# Patient Record
Sex: FEMALE | Race: WHITE | Employment: OTHER | URBAN - METROPOLITAN AREA
[De-identification: names, ages, dates, MRNs, and addresses within clinical notes are randomized per-mention and may not be internally consistent; named-entity substitution may affect disease eponyms.]

---

## 2017-01-14 ENCOUNTER — HOSPITAL ENCOUNTER (OUTPATIENT)
Dept: CT IMAGING | Age: 57
Discharge: HOME | End: 2017-01-14
Attending: FAMILY MEDICINE
Payer: MEDICAID

## 2017-01-14 DIAGNOSIS — R10.30 LOWER ABDOMINAL PAIN: ICD-10-CM

## 2017-01-14 DIAGNOSIS — R19.5 LOOSE STOOLS: ICD-10-CM

## 2017-01-14 DIAGNOSIS — R15.9 INCONTINENCE OF FECES, UNSPECIFIED FECAL INCONTINENCE TYPE: ICD-10-CM

## 2017-01-14 LAB — CREAT BLD-MCNC: 1 MG/DL (ref 0.5–1.5)

## 2017-01-14 PROCEDURE — 82565 ASSAY OF CREATININE: CPT

## 2017-01-14 PROCEDURE — 74177 CT ABD & PELVIS W/CONTRAST: CPT

## 2017-01-26 ENCOUNTER — TELEPHONE (OUTPATIENT)
Dept: FAMILY MEDICINE CLINIC | Facility: CLINIC | Age: 57
End: 2017-01-26

## 2017-01-26 NOTE — TELEPHONE ENCOUNTER
Patient reports that she has a rash on back and is experiencing leg pain. She is concerned with recurrence of shingles.  She has an appt scheduled with Dr Suzanne Petresen tomorrow afternoon, but she would like to quickly discuss her concerns before her appt because

## 2017-01-27 ENCOUNTER — OFFICE VISIT (OUTPATIENT)
Dept: FAMILY MEDICINE CLINIC | Facility: CLINIC | Age: 57
End: 2017-01-27

## 2017-01-27 VITALS
DIASTOLIC BLOOD PRESSURE: 83 MMHG | RESPIRATION RATE: 14 BRPM | TEMPERATURE: 98 F | SYSTOLIC BLOOD PRESSURE: 120 MMHG | HEART RATE: 83 BPM | HEIGHT: 63.5 IN

## 2017-01-27 DIAGNOSIS — L28.0 NEURODERMATITIS: ICD-10-CM

## 2017-01-27 DIAGNOSIS — L29.9 LOCALIZED PRURITUS: ICD-10-CM

## 2017-01-27 DIAGNOSIS — L30.9 DERMATITIS: Primary | ICD-10-CM

## 2017-01-27 PROCEDURE — 99212 OFFICE O/P EST SF 10 MIN: CPT | Performed by: FAMILY MEDICINE

## 2017-01-27 PROCEDURE — 99214 OFFICE O/P EST MOD 30 MIN: CPT | Performed by: FAMILY MEDICINE

## 2017-01-27 RX ORDER — HYDROXYZINE HYDROCHLORIDE 10 MG/1
10 TABLET, FILM COATED ORAL 3 TIMES DAILY PRN
Qty: 40 TABLET | Refills: 0 | Status: SHIPPED | OUTPATIENT
Start: 2017-01-27 | End: 2017-02-06

## 2017-01-27 NOTE — PROGRESS NOTES
Patient ID: Gt Lyon is a 55year old female. HPI  Patient presents with:  Rash: back and head    She has lesions on her scalp and upper back.   She does have anxiety and states she is very worried as her  who is been at a job for 15 years is alert and oriented to person, place, and time. Skin: Skin is warm and dry. Psychiatric: Her mood appears anxious. Nursing note and vitals reviewed. Blood pressure 120/83, pulse 83, temperature 98.4 °F (36.9 °C), temperature source Oral, resp.  r

## 2017-02-09 ENCOUNTER — OFFICE VISIT (OUTPATIENT)
Dept: DERMATOLOGY CLINIC | Facility: CLINIC | Age: 57
End: 2017-02-09

## 2017-02-09 DIAGNOSIS — D23.9 BENIGN NEOPLASM OF SKIN, UNSPECIFIED LOCATION: ICD-10-CM

## 2017-02-09 DIAGNOSIS — L30.9 DERMATITIS: Primary | ICD-10-CM

## 2017-02-09 DIAGNOSIS — L29.9 PRURITUS: ICD-10-CM

## 2017-02-09 PROCEDURE — 99202 OFFICE O/P NEW SF 15 MIN: CPT | Performed by: DERMATOLOGY

## 2017-02-09 PROCEDURE — 99212 OFFICE O/P EST SF 10 MIN: CPT | Performed by: DERMATOLOGY

## 2017-02-09 RX ORDER — CEPHALEXIN 500 MG/1
500 CAPSULE ORAL 2 TIMES DAILY
Qty: 14 CAPSULE | Refills: 0 | Status: SHIPPED | OUTPATIENT
Start: 2017-02-09 | End: 2017-03-09

## 2017-02-09 RX ORDER — HYDROXYZINE HYDROCHLORIDE 25 MG/1
25 TABLET, FILM COATED ORAL EVERY 8 HOURS PRN
Qty: 60 TABLET | Refills: 3 | Status: SHIPPED | OUTPATIENT
Start: 2017-02-09 | End: 2017-05-10

## 2017-02-13 NOTE — PROGRESS NOTES
Robyn Phillips is a 55year old female. Patient presents with:  Rash: New patient present with rash to scalp, back, and rolf arms since afer dx with shingles (couple months. ). Red itching lesions. 9/10 pain and bleeding when scratched.  Dr. Carolin Caro rx o (two) times daily. Disp: 14 capsule Rfl: 0   triamcinolone acetonide 0.1 % External Cream Apply 1 Application topically 2 (two) times daily.  Disp: 454 g Rfl: 3   HydrOXYzine HCl 25 MG Oral Tab Take 1 tablet (25 mg total) by mouth every 8 (eight) hours as n History   Problem Relation Age of Onset   • Diabetes Father    • Hypertension Father    • Heart Disease Father    • Stroke Father    • Cancer Father      skin cancer   • Hypertension Mother    • Heart Disease Mother    • Stroke Mother      Stroke   • Hyper Melanie Brown. Mild eczematous changes. Numerous excoriations with secondary infection. Unclear whether ulceration at right parietal scalp from pruritus, prurigo nodularis or post shingles. Discussed use of topicals. Stress likely aggravating.   Hydroxyzine above.

## 2017-02-14 ENCOUNTER — TELEPHONE (OUTPATIENT)
Dept: FAMILY MEDICINE CLINIC | Facility: CLINIC | Age: 57
End: 2017-02-14

## 2017-02-14 NOTE — TELEPHONE ENCOUNTER
Pt is calling want to know when was her last pap and is she due for another  Pt is requesting a call back

## 2017-02-14 NOTE — TELEPHONE ENCOUNTER
Informed pt that I do not have a pap on file for her. She states that she use to get them done at a free clinic years ago. I advised to get one done here to make sure she is up to date. I transferred her to hospitals to schedule.

## 2017-03-01 RX ORDER — PRAVASTATIN SODIUM 40 MG
TABLET ORAL
Qty: 30 TABLET | Refills: 2 | Status: SHIPPED | OUTPATIENT
Start: 2017-03-01

## 2017-03-09 ENCOUNTER — TELEPHONE (OUTPATIENT)
Dept: FAMILY MEDICINE CLINIC | Facility: CLINIC | Age: 57
End: 2017-03-09

## 2017-03-09 ENCOUNTER — LAB ENCOUNTER (OUTPATIENT)
Dept: LAB | Age: 57
End: 2017-03-09
Attending: FAMILY MEDICINE
Payer: MEDICAID

## 2017-03-09 ENCOUNTER — OFFICE VISIT (OUTPATIENT)
Dept: FAMILY MEDICINE CLINIC | Facility: CLINIC | Age: 57
End: 2017-03-09

## 2017-03-09 VITALS
BODY MASS INDEX: 21 KG/M2 | HEART RATE: 74 BPM | DIASTOLIC BLOOD PRESSURE: 89 MMHG | SYSTOLIC BLOOD PRESSURE: 137 MMHG | WEIGHT: 121.63 LBS

## 2017-03-09 DIAGNOSIS — R30.0 DYSURIA: Primary | ICD-10-CM

## 2017-03-09 DIAGNOSIS — R10.31 RLQ ABDOMINAL PAIN: ICD-10-CM

## 2017-03-09 DIAGNOSIS — R30.0 DYSURIA: ICD-10-CM

## 2017-03-09 LAB — ERYTHROCYTE [SEDIMENTATION RATE] IN BLOOD: 8 MM/HR (ref 0–30)

## 2017-03-09 PROCEDURE — 99212 OFFICE O/P EST SF 10 MIN: CPT | Performed by: FAMILY MEDICINE

## 2017-03-09 PROCEDURE — 85060 BLOOD SMEAR INTERPRETATION: CPT

## 2017-03-09 PROCEDURE — 36415 COLL VENOUS BLD VENIPUNCTURE: CPT

## 2017-03-09 PROCEDURE — 85025 COMPLETE CBC W/AUTO DIFF WBC: CPT

## 2017-03-09 PROCEDURE — 99213 OFFICE O/P EST LOW 20 MIN: CPT | Performed by: FAMILY MEDICINE

## 2017-03-09 PROCEDURE — 85652 RBC SED RATE AUTOMATED: CPT

## 2017-03-09 RX ORDER — CIPROFLOXACIN 500 MG/1
500 TABLET, FILM COATED ORAL 2 TIMES DAILY
Qty: 10 TABLET | Refills: 0 | Status: SHIPPED | OUTPATIENT
Start: 2017-03-09 | End: 2017-03-14

## 2017-03-09 NOTE — PROGRESS NOTES
Zaire Augustine is a 55year old female. Patient presents with:  Pelvic Pain    HPI:   Took otc azo this morning. Symptoms started a few days ago. Pain  Right lower abdomen. No fevers. No vomiting but having nausea.  Having sharp pain weather urinating or n clear to auscultation  CARDIO: RRR without murmur  GI: good BS's,no masses, HSM. RLQ tenderness. No rebound. guarding  EXTREMITIES: no cyanosis, clubbing or edema      ASSESSMENT AND PLAN:   1.  Dysuria  Unable to read urine because of azo.   - Urine Cultur

## 2017-03-09 NOTE — TELEPHONE ENCOUNTER
Reason for Call/Chief Complaint:  Patient states she feels she has a UTI again   Onset: Few days - today worse   Nursing Assessment/Associated Symptoms: Patient states that she feels like she has a UTI once again.  States she has had a few times in the past

## 2017-03-09 NOTE — TELEPHONE ENCOUNTER
Pt states having a lot of pain- did not provide any further information  Requesting appt today with Dr Mike Champion or Dr Frantz Nguyen slots    Call transferred [phone]

## 2017-03-10 LAB
BASOPHILS # BLD: 0.1 K/UL (ref 0–0.2)
BASOPHILS NFR BLD: 1 %
EOSINOPHIL # BLD: 1 K/UL (ref 0–0.7)
EOSINOPHIL NFR BLD: 13 %
ERYTHROCYTE [DISTWIDTH] IN BLOOD BY AUTOMATED COUNT: 13.3 % (ref 11–15)
HCT VFR BLD AUTO: 37.3 % (ref 35–48)
HGB BLD-MCNC: 12.4 G/DL (ref 12–16)
LYMPHOCYTES # BLD: 1.7 K/UL (ref 1–4)
LYMPHOCYTES NFR BLD: 22 %
MCH RBC QN AUTO: 29 PG (ref 27–32)
MCHC RBC AUTO-ENTMCNC: 33.3 G/DL (ref 32–37)
MCV RBC AUTO: 87.2 FL (ref 80–100)
MONOCYTES # BLD: 0.2 K/UL (ref 0–1)
MONOCYTES NFR BLD: 3 %
NEUTROPHILS # BLD AUTO: 4.7 K/UL (ref 1.8–7.7)
NEUTROPHILS NFR BLD: 62 %
PLATELET # BLD AUTO: 174 K/UL (ref 140–400)
PMV BLD AUTO: 10.5 FL (ref 7.4–10.3)
RBC # BLD AUTO: 4.28 M/UL (ref 3.7–5.4)
WBC # BLD AUTO: 7.7 K/UL (ref 4–11)

## 2017-03-10 NOTE — TELEPHONE ENCOUNTER
Per pt, she went to her pharmacy on file and never received the Cipro,  Pls resend pt is waiting. Pls call pt when rx med was resend.

## 2017-03-10 NOTE — TELEPHONE ENCOUNTER
Pt was seen today for poss uti. Cipro was sent to the pharmacy but the pharmacy is telling the pt that they did not receive it. Did Dr. Jonathon Ferraro intend to prescribe this medication?  The med was sent to the pharmacy prior to pt's appt

## 2017-03-10 NOTE — TELEPHONE ENCOUNTER
Noted cipro was sent to [facility] by Dr Kamiin Tsang before the OV appt time of 3:30 as pointed out by CSS below--but from Dr Bryant Tsang note time stamp can deduce that pt was simply seen before the 3:30 appt time.  Pt states Dr Dimitrios Davies did state at the [address] she would be sendin

## 2017-03-14 ENCOUNTER — TELEPHONE (OUTPATIENT)
Dept: FAMILY MEDICINE CLINIC | Facility: CLINIC | Age: 57
End: 2017-03-14

## 2017-03-14 NOTE — TELEPHONE ENCOUNTER
----- Message from Poppy Bartlett MD sent at 3/14/2017 10:41 AM CDT -----  Tests are all normal. Nl WBC and normal urine culture. Pt did not have an urinary tract infection.

## 2017-03-14 NOTE — PROGRESS NOTES
Quick Note:    Tests are all normal. Nl WBC and normal urine culture. Pt did not have an urinary tract infection.     ______

## 2017-03-14 NOTE — TELEPHONE ENCOUNTER
Dr Olesya Tolliver, the patient has no urinary symptoms, but she continues to have lower abdominal pain

## 2017-03-14 NOTE — TELEPHONE ENCOUNTER
This seems to be chronic in nature. CT scan has been normal. Pt to follow up with GI regarding this.

## 2017-03-23 ENCOUNTER — TELEPHONE (OUTPATIENT)
Dept: FAMILY MEDICINE CLINIC | Facility: CLINIC | Age: 57
End: 2017-03-23

## 2017-03-23 NOTE — TELEPHONE ENCOUNTER
FEVER YESTERDAY, WEAK, FLU ARE THE SYMPTOMS.   PATIENT ASKING IF THE DOCTOR CAN CALL SOMETHING IN FOR HER

## 2017-03-23 NOTE — TELEPHONE ENCOUNTER
Actions Requested: Advised ER today for possible dehydration--pt will be driven there (usure which one).  Staff to f/u tomorrow  Situation/Background   Problem: vomiting and diarrhea   Onset: Tuesday   Associated Symptoms: Fever ~101 yesterday; no fever not

## 2017-03-28 NOTE — TELEPHONE ENCOUNTER
RYAN soto, called pt for ER f/u today  Pt stts she went [facility] and was diagnosed with flu and dehydration, was not admitted and discharged home to rest and push fluids  Pt stts she is getting better each day but stts she still can't eat (eats as tolerated), fee

## 2017-03-30 ENCOUNTER — OFFICE VISIT (OUTPATIENT)
Dept: FAMILY MEDICINE CLINIC | Facility: CLINIC | Age: 57
End: 2017-03-30

## 2017-03-30 VITALS
HEART RATE: 66 BPM | DIASTOLIC BLOOD PRESSURE: 74 MMHG | SYSTOLIC BLOOD PRESSURE: 117 MMHG | WEIGHT: 130 LBS | HEIGHT: 63.5 IN | BODY MASS INDEX: 22.75 KG/M2 | TEMPERATURE: 99 F

## 2017-03-30 DIAGNOSIS — R19.7 DIARRHEA, UNSPECIFIED TYPE: ICD-10-CM

## 2017-03-30 DIAGNOSIS — K13.0 LIP ULCER: Primary | ICD-10-CM

## 2017-03-30 PROCEDURE — 99212 OFFICE O/P EST SF 10 MIN: CPT | Performed by: FAMILY MEDICINE

## 2017-03-30 PROCEDURE — 99213 OFFICE O/P EST LOW 20 MIN: CPT | Performed by: FAMILY MEDICINE

## 2017-03-30 RX ORDER — DIPHENOXYLATE HYDROCHLORIDE AND ATROPINE SULFATE 2.5; .025 MG/1; MG/1
1 TABLET ORAL 4 TIMES DAILY PRN
Qty: 20 TABLET | Refills: 0 | Status: SHIPPED | OUTPATIENT
Start: 2017-03-30 | End: 2017-04-25

## 2017-03-30 RX ORDER — ONDANSETRON 4 MG/1
4 TABLET, ORALLY DISINTEGRATING ORAL
Refills: 0 | COMMUNITY
Start: 2017-03-24 | End: 2017-06-29 | Stop reason: ALTCHOICE

## 2017-03-30 RX ORDER — VALACYCLOVIR HYDROCHLORIDE 1 G/1
1 TABLET, FILM COATED ORAL EVERY 12 HOURS SCHEDULED
Qty: 14 TABLET | Refills: 0 | Status: SHIPPED | OUTPATIENT
Start: 2017-03-30 | End: 2017-04-06

## 2017-03-30 NOTE — PROGRESS NOTES
Dante Gillis is a 55year old female. Patient presents with:  Cough: productive clear mucusx 2 days  Diarrhea: abdominal pain    HPI:   Here for follow up. Went to [facility] for the flu.  Went there last week - went to the ER and received fluids - pain, back pain    EXAM:   /74 mmHg  Pulse 66  Temp(Src) 98.9 °F (37.2 °C) (Tympanic)  Ht 5' 3.5\" (1.613 m)  Wt 130 lb (58.968 kg)  BMI 22.66 kg/m2  GENERAL: well developed, well nourished,in no apparent distress  SKIN: left edge of lips ulcerations

## 2017-04-03 ENCOUNTER — TELEPHONE (OUTPATIENT)
Dept: GASTROENTEROLOGY | Facility: CLINIC | Age: 57
End: 2017-04-03

## 2017-04-03 NOTE — TELEPHONE ENCOUNTER
Dr. Milton Barksdale- please refer to PCP's most recent office visit note of 3/30/17 as well as your last office visit note with her on 4/16/15; EGD was done on 4/28/15 with 3 year recall; please advise for appt.; thanks!

## 2017-04-03 NOTE — TELEPHONE ENCOUNTER
Pt was referred by Dr. Alison Nice to be seen sooner than first available due to abdominal pain. Please call. Pt has [employer].

## 2017-04-03 NOTE — TELEPHONE ENCOUNTER
Complex patient with chronic abdominal pain. Reassuring CT scan January 14, 2017. Previous EGD and colonoscopy exams reviewed. Unfortunately, this will need to wait for next available for follow-up.

## 2017-04-04 NOTE — TELEPHONE ENCOUNTER
Messages left on both listed phone nos for pt to call back re: appt.; this is booked on 5/31/17 at 1600.

## 2017-04-04 NOTE — TELEPHONE ENCOUNTER
Pt returned call -  CSS offered 5/31/17 at 1600 and pt states the appt is too late. Requesting earlier in the day. PLs call. Thank you.

## 2017-04-04 NOTE — TELEPHONE ENCOUNTER
Pt is contacted re: earlier appt and this is booked for 5/12/17 at 1330 at [facility], arrival timie 1315; pt is aware of [facility] location and she is agreeable with this.

## 2017-04-22 ENCOUNTER — TELEPHONE (OUTPATIENT)
Dept: FAMILY MEDICINE CLINIC | Facility: CLINIC | Age: 57
End: 2017-04-22

## 2017-04-22 NOTE — TELEPHONE ENCOUNTER
Actions Requested: back pain, ICC, F/u on monday  Situation/Background   Problem: per pt right shoulder pain. Pain level 9/10. Tried tylenol with no relief. Hurts with movement.     Onset: 1 wk   Associated Symptoms: none   History of Same: broken shoulder

## 2017-04-22 NOTE — TELEPHONE ENCOUNTER
Patient reports that she is experiencing severe, sharp, burning pains in shoulder that occurs when she lifts anything. She states it causes her to double over. Please call [phone].

## 2017-04-24 NOTE — TELEPHONE ENCOUNTER
Pt states that she did not go to the IC over the weekend. Her pain continues at the same level of intensity. She is currently taking tylenol without significant relief. Can pt be added to Dr. Vamshi Rosas schedule tomorrow? Only SDS slots available.

## 2017-04-25 ENCOUNTER — OFFICE VISIT (OUTPATIENT)
Dept: FAMILY MEDICINE CLINIC | Facility: CLINIC | Age: 57
End: 2017-04-25

## 2017-04-25 VITALS
BODY MASS INDEX: 21 KG/M2 | DIASTOLIC BLOOD PRESSURE: 85 MMHG | WEIGHT: 118.63 LBS | HEART RATE: 79 BPM | SYSTOLIC BLOOD PRESSURE: 116 MMHG

## 2017-04-25 DIAGNOSIS — B02.9 HERPES ZOSTER WITHOUT COMPLICATION: Primary | ICD-10-CM

## 2017-04-25 PROCEDURE — 99213 OFFICE O/P EST LOW 20 MIN: CPT | Performed by: FAMILY MEDICINE

## 2017-04-25 PROCEDURE — 99212 OFFICE O/P EST SF 10 MIN: CPT | Performed by: FAMILY MEDICINE

## 2017-04-25 RX ORDER — VALACYCLOVIR HYDROCHLORIDE 1 G/1
1 TABLET, FILM COATED ORAL 3 TIMES DAILY
Qty: 21 TABLET | Refills: 0 | Status: SHIPPED | OUTPATIENT
Start: 2017-04-25 | End: 2017-05-02

## 2017-04-25 RX ORDER — METHYLPREDNISOLONE 4 MG/1
TABLET ORAL
Qty: 1 KIT | Refills: 0 | Status: SHIPPED | OUTPATIENT
Start: 2017-04-25 | End: 2017-06-26

## 2017-04-25 NOTE — H&P
Mikaela Ragland is a 55year old female. Patient presents with:  Shoulder Pain: right shoulder     HPI:   Pt reports she is in major pain. Reports did not fall. Started about a week ago. Reports just waking up with the pain.       Current Outpatient Prescri erythematous rash noted.  Tenderness with light touch to the skin  NECK: supple,no adenopathy,no bruits  LUNGS: clear to auscultation  CARDIO: RRR without murmur  EXTREMITIES: no cyanosis, clubbing or edema  Musculoskeletal: movement of right shoulder overa

## 2017-05-12 ENCOUNTER — TELEPHONE (OUTPATIENT)
Dept: GASTROENTEROLOGY | Facility: CLINIC | Age: 57
End: 2017-05-12

## 2017-05-12 ENCOUNTER — OFFICE VISIT (OUTPATIENT)
Dept: GASTROENTEROLOGY | Facility: CLINIC | Age: 57
End: 2017-05-12

## 2017-05-12 VITALS
HEIGHT: 63.5 IN | SYSTOLIC BLOOD PRESSURE: 108 MMHG | DIASTOLIC BLOOD PRESSURE: 72 MMHG | WEIGHT: 120 LBS | HEART RATE: 102 BPM | BODY MASS INDEX: 21 KG/M2

## 2017-05-12 DIAGNOSIS — R10.30 LOWER ABDOMINAL PAIN: ICD-10-CM

## 2017-05-12 DIAGNOSIS — R19.4 CHANGE IN BOWEL HABITS: Primary | ICD-10-CM

## 2017-05-12 DIAGNOSIS — R93.3 ABNORMAL CT SCAN, COLON: ICD-10-CM

## 2017-05-12 PROCEDURE — 99212 OFFICE O/P EST SF 10 MIN: CPT | Performed by: INTERNAL MEDICINE

## 2017-05-12 PROCEDURE — 99214 OFFICE O/P EST MOD 30 MIN: CPT | Performed by: INTERNAL MEDICINE

## 2017-05-12 NOTE — TELEPHONE ENCOUNTER
[facility] pharmacy contacted and spoke to [facility] who wanted to know if OK to substitute generic, I gave the verbal OK, he verbalized understanding.

## 2017-05-12 NOTE — TELEPHONE ENCOUNTER
Scheduled for: Colonoscopy 16538  Provider Name: Dr Mame Guerrero  Date:    Location:  [address] [place] [address]  Sedation:  MAC  Time:    Prep: colyte  Meds/Allergies Reconciled?:  Atorvastatin, ibuprofen, strawberry  Diagnosis with codes:  Change in bowel habits R19.4, Abnormal CT scan

## 2017-05-12 NOTE — PROGRESS NOTES
HPI:    Patient ID: Art Renee is a 55year old woman with distant history of cervical cancer, also dyslipidemia and depression. Cervical cancer was treated in the distal past possibly only with \"freezing\".   May have undergone a staging laparoscopy : Sukhi Rome MD (Physician)     Expand All Collapse All    HPI:     Patient ID: Collins Kinney returns for follow-up on waxing and waning, recurring abdominal pain. Her first remark on her interview today is \"I'm in pain. \"  She d symptoms, dysphagia, dyspepsia, nausea or vomiting.  10 point review of systems otherwise negative. Family history includes an aunt with colorectal cancer.     Ms. Carole Aragon is a smoker.    ============================================    Patient: Greg Velasquez, discomfort, as above. IMPRESSION:  1. Internal hemorrhoids.   2. Likely irritable bowel syndrome, visceral hypersensitivity with unusual  sensitivity to air insufflation, manipulation of the colonoscope,  contractions of the colon, even under the sedatio gastric cardia on retroflexed view.  Two   good  biopsies taken of this inflamed-appearing fold in the retroflexed    view in  the gastric cardia, likely gastric mucosa.  Looking around the    stomach,  there was diffuse mucosal edema without ulcer or eros with mild reflux   esophagitis.     *  Focal goblet cell metaplasia present (see comment).      *  No dysplasia identified.     *  Diff-Quik stain negative for Helicobacter pylori organisms (appropriate   control).     ===================  PET/CT scan 5/201 lesion is identified.    BILIARY:          Cholelithiasis.   No intra- or extra-hepatic biliary ductal dilatation.    SPLEEN:          Normal in size. Peripheral hypodensities are nonspecific, likely cystic in nature or hemangiomas.   PANCREAS:     No lesi Systems         Current Outpatient Prescriptions:  ondansetron 4 MG Oral Tablet Dispersible Take 4 mg by mouth. prn Disp:  Rfl: 0   PRAVASTATIN SODIUM 40 MG Oral Tab TAKE 1 TABLET (40 MG TOTAL) BY MOUTH NIGHTLY.  Disp: 30 tablet Rfl: 2   nicotine 21 MG/24HR colonoscopy examination to evaluate CT findings and the symptoms. Consider sending stool aspirate. I recommended colonoscopy examination with possible biopsy, possible polypectomy.  We discussed sedation options of conscious sedation versus MAC anesthes

## 2017-05-12 NOTE — PATIENT INSTRUCTIONS
Schedule colonoscopy exam at [facility]    MAC anesthesia    Golytely (PEG) 4L bowel prep    DX = change in bowel patterns, abnormal CT scan of the colon

## 2017-05-22 NOTE — TELEPHONE ENCOUNTER
Scheduled for:  Colonoscopy 18202  Provider Name:  Dr. Alan Garvin  Date:  7/31/17  Location:  [facility]  Sedation:  MAC  Time:  6611 (pt is aware to arrive at 1230)  Prep:  Colyte, mailed 5/23/17  Meds/Allergies Reconciled?:  Physician reviewed  Diagnosis with codes

## 2017-06-07 ENCOUNTER — HOSPITAL ENCOUNTER (OUTPATIENT)
Dept: MAMMOGRAPHY | Facility: HOSPITAL | Age: 57
Discharge: HOME | End: 2017-06-07
Attending: FAMILY MEDICINE
Payer: MEDICAID

## 2017-06-07 ENCOUNTER — HOSPITAL ENCOUNTER (OUTPATIENT)
Dept: ULTRASOUND IMAGING | Facility: HOSPITAL | Age: 57
Discharge: HOME | End: 2017-06-07
Attending: FAMILY MEDICINE
Payer: MEDICAID

## 2017-06-07 DIAGNOSIS — N63.10 MASS OF RIGHT BREAST: ICD-10-CM

## 2017-06-07 PROCEDURE — 76642 ULTRASOUND BREAST LIMITED: CPT | Performed by: FAMILY MEDICINE

## 2017-06-07 PROCEDURE — 77065 DX MAMMO INCL CAD UNI: CPT | Performed by: FAMILY MEDICINE

## 2017-06-26 ENCOUNTER — OFFICE VISIT (OUTPATIENT)
Dept: FAMILY MEDICINE CLINIC | Facility: CLINIC | Age: 57
End: 2017-06-26

## 2017-06-26 ENCOUNTER — LAB ENCOUNTER (OUTPATIENT)
Dept: LAB | Age: 57
End: 2017-06-26
Attending: FAMILY MEDICINE
Payer: MEDICAID

## 2017-06-26 VITALS
HEIGHT: 63.5 IN | HEART RATE: 105 BPM | SYSTOLIC BLOOD PRESSURE: 127 MMHG | WEIGHT: 120 LBS | DIASTOLIC BLOOD PRESSURE: 83 MMHG | BODY MASS INDEX: 21 KG/M2

## 2017-06-26 DIAGNOSIS — R59.0 LYMPHADENOPATHY, CERVICAL: Primary | ICD-10-CM

## 2017-06-26 DIAGNOSIS — R59.0 LYMPHADENOPATHY, CERVICAL: ICD-10-CM

## 2017-06-26 DIAGNOSIS — L98.9 SKIN LESION: ICD-10-CM

## 2017-06-26 PROCEDURE — 99213 OFFICE O/P EST LOW 20 MIN: CPT | Performed by: FAMILY MEDICINE

## 2017-06-26 PROCEDURE — 99212 OFFICE O/P EST SF 10 MIN: CPT | Performed by: FAMILY MEDICINE

## 2017-06-26 PROCEDURE — 36415 COLL VENOUS BLD VENIPUNCTURE: CPT

## 2017-06-26 PROCEDURE — 85025 COMPLETE CBC W/AUTO DIFF WBC: CPT

## 2017-06-26 PROCEDURE — 96372 THER/PROPH/DIAG INJ SC/IM: CPT | Performed by: FAMILY MEDICINE

## 2017-06-26 PROCEDURE — 85652 RBC SED RATE AUTOMATED: CPT

## 2017-06-26 RX ORDER — CEPHALEXIN 500 MG/1
500 CAPSULE ORAL 3 TIMES DAILY
Qty: 4030 CAPSULE | Refills: 0 | Status: SHIPPED | OUTPATIENT
Start: 2017-06-26 | End: 2017-07-06 | Stop reason: ALTCHOICE

## 2017-06-26 RX ORDER — CEFTRIAXONE 1 G/1
1000 INJECTION, POWDER, FOR SOLUTION INTRAMUSCULAR; INTRAVENOUS ONCE
Status: COMPLETED | OUTPATIENT
Start: 2017-06-26 | End: 2017-06-26

## 2017-06-26 RX ADMIN — CEFTRIAXONE 1000 MG: 1 INJECTION, POWDER, FOR SOLUTION INTRAMUSCULAR; INTRAVENOUS at 14:27:00

## 2017-06-26 NOTE — PROGRESS NOTES
Dante Gillis is a 65year old female. Patient presents with:  Bump: head, neck     HPI:   1 week with scalp lesions and then neck started to hurt her. No fevers and no chills. Reports not itchy. No one else in family with it.      Current Outpatient Pres (54.4 kg)   BMI 20.92 kg/m²   GENERAL: well developed, well nourished,in no apparent distress  SKIN:posterior scalp dime size crusted ulcerative lesion - 2 similar lesions in right anterior scalp   Right tender posterior cervical lymphadenopathy   ASSESSME

## 2017-06-29 ENCOUNTER — HOSPITAL ENCOUNTER (OUTPATIENT)
Dept: GENERAL RADIOLOGY | Age: 57
Discharge: HOME | End: 2017-06-29
Attending: FAMILY MEDICINE
Payer: MEDICAID

## 2017-06-29 ENCOUNTER — OFFICE VISIT (OUTPATIENT)
Dept: FAMILY MEDICINE CLINIC | Facility: CLINIC | Age: 57
End: 2017-06-29

## 2017-06-29 VITALS
HEART RATE: 65 BPM | DIASTOLIC BLOOD PRESSURE: 89 MMHG | WEIGHT: 118.19 LBS | BODY MASS INDEX: 21 KG/M2 | SYSTOLIC BLOOD PRESSURE: 119 MMHG | TEMPERATURE: 98 F

## 2017-06-29 DIAGNOSIS — R59.1 LYMPHADENOPATHY: ICD-10-CM

## 2017-06-29 DIAGNOSIS — R91.1 PULMONARY NODULE: ICD-10-CM

## 2017-06-29 DIAGNOSIS — R59.1 LYMPHADENOPATHY: Primary | ICD-10-CM

## 2017-06-29 PROCEDURE — 99213 OFFICE O/P EST LOW 20 MIN: CPT | Performed by: FAMILY MEDICINE

## 2017-06-29 PROCEDURE — 99212 OFFICE O/P EST SF 10 MIN: CPT | Performed by: FAMILY MEDICINE

## 2017-06-29 PROCEDURE — 71020 XR CHEST PA + LAT CHEST (CPT=71020): CPT | Performed by: FAMILY MEDICINE

## 2017-06-29 NOTE — PROGRESS NOTES
Ana Nichols is a 65year old female. Patient presents with:  Lesion: skin lesion fup     HPI:   Here for close follow up. Was seen few days ago and given Rocephin IM and started on keflex TID.  Reports still having some pain in her neck but lesions le kg/m²   GENERAL: well developed, well nourished,in no apparent distress  SKIN:posterior scalp dime size crusted ulcerative lesion - 2 similar lesions in right anterior scalp   Right tender posterior cervical lymphadenopathy   LUNGS: clear to auscultation

## 2017-06-29 NOTE — PROGRESS NOTES
Tests are all normal. Please continue with current treatment plan and make sure scheduled for CT chest.

## 2017-07-01 ENCOUNTER — TELEPHONE (OUTPATIENT)
Dept: FAMILY MEDICINE CLINIC | Facility: CLINIC | Age: 57
End: 2017-07-01

## 2017-07-01 NOTE — TELEPHONE ENCOUNTER
----- Message from Walker Pineda MD sent at 6/29/2017 12:51 PM CDT -----  Tests are all normal. Please continue with current treatment plan and make sure scheduled for CT chest.

## 2017-07-06 ENCOUNTER — OFFICE VISIT (OUTPATIENT)
Dept: FAMILY MEDICINE CLINIC | Facility: CLINIC | Age: 57
End: 2017-07-06

## 2017-07-06 VITALS
WEIGHT: 120 LBS | HEART RATE: 89 BPM | BODY MASS INDEX: 21 KG/M2 | SYSTOLIC BLOOD PRESSURE: 104 MMHG | DIASTOLIC BLOOD PRESSURE: 72 MMHG

## 2017-07-06 DIAGNOSIS — R59.1 HEAD AND NECK LYMPHADENOPATHY: Primary | ICD-10-CM

## 2017-07-06 DIAGNOSIS — R59.0 CERVICAL LYMPHADENOPATHY: ICD-10-CM

## 2017-07-06 PROCEDURE — 99212 OFFICE O/P EST SF 10 MIN: CPT | Performed by: FAMILY MEDICINE

## 2017-07-06 PROCEDURE — 99213 OFFICE O/P EST LOW 20 MIN: CPT | Performed by: FAMILY MEDICINE

## 2017-07-06 NOTE — PROGRESS NOTES
Deirdre Mullen is a 65year old female. Patient presents with:  Bump    HPI:   Follow up. Been about 2 weeks now with symptoms. Seen on 6/26 and  Again on 6/29. Received rocephin IM and po keflex. No improvements.  Reports now left side of neck is swelli normocephalic,ears and throat are clear  NECK: bilateral cervical tender lymphadenopathy   LUNGS: clear to auscultation  CARDIO: RRR without murmur      ASSESSMENT AND PLAN:   1.  Head and neck lymphadenopathy  Started with skin lesions on scalp then right

## 2017-07-07 ENCOUNTER — OFFICE VISIT (OUTPATIENT)
Dept: OTOLARYNGOLOGY | Facility: CLINIC | Age: 57
End: 2017-07-07

## 2017-07-07 VITALS
BODY MASS INDEX: 21 KG/M2 | SYSTOLIC BLOOD PRESSURE: 106 MMHG | TEMPERATURE: 98 F | HEIGHT: 63.5 IN | WEIGHT: 120 LBS | DIASTOLIC BLOOD PRESSURE: 80 MMHG

## 2017-07-07 DIAGNOSIS — R59.1 LYMPHADENOPATHY: Primary | ICD-10-CM

## 2017-07-07 DIAGNOSIS — B02.29 POSTHERPETIC NEURALGIA: ICD-10-CM

## 2017-07-07 PROCEDURE — 99212 OFFICE O/P EST SF 10 MIN: CPT | Performed by: OTOLARYNGOLOGY

## 2017-07-07 PROCEDURE — 99244 OFF/OP CNSLTJ NEW/EST MOD 40: CPT | Performed by: OTOLARYNGOLOGY

## 2017-07-07 RX ORDER — AMOXICILLIN 500 MG/1
500 CAPSULE ORAL 3 TIMES DAILY
Qty: 30 CAPSULE | Refills: 0 | Status: SHIPPED | OUTPATIENT
Start: 2017-07-07 | End: 2017-07-14

## 2017-07-07 RX ORDER — METHYLPREDNISOLONE 4 MG/1
TABLET ORAL
Qty: 1 PACKAGE | Refills: 0 | Status: SHIPPED | OUTPATIENT
Start: 2017-07-07

## 2017-07-07 NOTE — PROGRESS NOTES
Wilbert Goldberg is a 65year old female.   Patient presents with:  Neck Pain: neck pain/swelling  for 4 months      HISTORY OF PRESENT ILLNESS  7/7/2017  She presents with neck pain she attributes this to the fact that she had shingles and after having sh URETERAL STENT  1980: LEEP  2005: LITHOTRIPSY  4/28/15: UPPER GI ENDOSCOPY,BIOPSY      REVIEW OF SYSTEMS    System Neg/Pos Details   Constitutional Negative Fatigue, fever and weight loss. ENMT Negative Drooling.    Eyes Negative Blurred vision and vision Nose/Mouth/Throat abNormal External nose - Normal. Lips/teeth/gums -multiple missing and carious teeth with periodontal disease tonsils - Normal. Oropharynx - Normal.Nares - Right: Normal Left: Normal. Septum -Normal  Turbinates - Right: Normal, Left: No

## 2017-07-07 NOTE — PATIENT INSTRUCTIONS
Lymphadenopathy  Lymphadenopathy is swelling of the lymph nodes. Lymph nodes are small, bean-shaped glands around the body. What are lymph nodes? Lymph nodes are part of your immune system.  The glands are found in your neck, armpits, groin, chest, and You may also have symptoms from an infection causing the swollen glands. These symptoms may include fever, sore throat, body aches, or cough. Diagnosing lymphadenopathy  Your health care provider will ask about your health history and symptoms.  He or she © 6882-3271 [address], [address] [place]. All rights reserved. This information is not intended as a substitute for professional medical care. Always follow your healthcare professional's instructions.

## 2017-07-10 ENCOUNTER — TELEPHONE (OUTPATIENT)
Dept: OTOLARYNGOLOGY | Facility: CLINIC | Age: 57
End: 2017-07-10

## 2017-07-10 NOTE — TELEPHONE ENCOUNTER
Called pt's insurance [phone], spoke with Darvin Johnson, case is pending clinical review, progress notes faxed to [phone], confirmation received, tracking number [id number].

## 2017-07-12 NOTE — TELEPHONE ENCOUNTER
Spoke with pt and informed that she can schedule CT soft tissue of neck at this time, authorization number [id number], valid 7-10-17 thru 9-8-17 to be done at [facility], [facility], [facility], [address] [place] [address].  Pt stated she does not w

## 2017-07-14 ENCOUNTER — HOSPITAL ENCOUNTER (OUTPATIENT)
Dept: CT IMAGING | Age: 57
Discharge: HOME | End: 2017-07-14
Attending: FAMILY MEDICINE
Payer: COMMERCIAL

## 2017-07-14 DIAGNOSIS — R91.1 PULMONARY NODULE: ICD-10-CM

## 2017-07-14 LAB — CREAT BLD-MCNC: 0.7 MG/DL (ref 0.5–1.5)

## 2017-07-14 PROCEDURE — 71260 CT THORAX DX C+: CPT | Performed by: FAMILY MEDICINE

## 2017-07-14 PROCEDURE — 82565 ASSAY OF CREATININE: CPT

## 2017-07-14 NOTE — TELEPHONE ENCOUNTER
Spoke with pt regarding changing the facility for her CT scan, pt stated she did contact her insurance and they were able to change the facility to [facility], pt was provided with central scheduling phone number.  Authorization number [id number]

## 2017-07-18 DIAGNOSIS — R91.1 PULMONARY NODULE: Primary | ICD-10-CM

## 2017-07-18 NOTE — PROGRESS NOTES
Nodule is overall stable with very slight growth so will have pt see pulmonary to make sure we do not need to continue imaging her for this.  Referral placed

## 2017-07-26 RX ORDER — SODIUM CHLORIDE, SODIUM LACTATE, POTASSIUM CHLORIDE, CALCIUM CHLORIDE 600; 310; 30; 20 MG/100ML; MG/100ML; MG/100ML; MG/100ML
INJECTION, SOLUTION INTRAVENOUS CONTINUOUS
Status: CANCELLED | OUTPATIENT
Start: 2017-07-26

## 2017-07-28 ENCOUNTER — HOSPITAL ENCOUNTER (OUTPATIENT)
Dept: CT IMAGING | Age: 57
Discharge: HOME | End: 2017-07-28
Attending: OTOLARYNGOLOGY
Payer: COMMERCIAL

## 2017-07-28 DIAGNOSIS — R59.1 LYMPHADENOPATHY: ICD-10-CM

## 2017-07-28 LAB — CREAT BLD-MCNC: 0.7 MG/DL (ref 0.5–1.5)

## 2017-07-28 PROCEDURE — 70491 CT SOFT TISSUE NECK W/DYE: CPT | Performed by: OTOLARYNGOLOGY

## 2017-07-28 PROCEDURE — 82565 ASSAY OF CREATININE: CPT

## 2017-07-29 ENCOUNTER — TELEPHONE (OUTPATIENT)
Dept: FAMILY MEDICINE CLINIC | Facility: CLINIC | Age: 57
End: 2017-07-29

## 2017-07-29 NOTE — TELEPHONE ENCOUNTER
Pt is requesting a prescription for the solution to drink before she takes to colonoscopy. Pt states she will have colonoscopy on Monday.

## 2017-07-30 ENCOUNTER — APPOINTMENT (OUTPATIENT)
Dept: GENERAL RADIOLOGY | Age: 57
End: 2017-07-30
Attending: EMERGENCY MEDICINE
Payer: COMMERCIAL

## 2017-07-30 ENCOUNTER — HOSPITAL ENCOUNTER (OUTPATIENT)
Age: 57
Discharge: HOME | End: 2017-07-30
Attending: EMERGENCY MEDICINE
Payer: COMMERCIAL

## 2017-07-30 VITALS
DIASTOLIC BLOOD PRESSURE: 93 MMHG | RESPIRATION RATE: 18 BRPM | TEMPERATURE: 98 F | HEIGHT: 63 IN | SYSTOLIC BLOOD PRESSURE: 151 MMHG | HEART RATE: 86 BPM | OXYGEN SATURATION: 99 % | BODY MASS INDEX: 21.26 KG/M2 | WEIGHT: 120 LBS

## 2017-07-30 DIAGNOSIS — S93.512A SPRAIN OF INTERPHALANGEAL JOINT OF LEFT GREAT TOE, INITIAL ENCOUNTER: Primary | ICD-10-CM

## 2017-07-30 PROCEDURE — 99213 OFFICE O/P EST LOW 20 MIN: CPT

## 2017-07-30 PROCEDURE — 73660 X-RAY EXAM OF TOE(S): CPT | Performed by: EMERGENCY MEDICINE

## 2017-07-30 RX ORDER — ACETAMINOPHEN 500 MG
1000 TABLET ORAL ONCE
Status: COMPLETED | OUTPATIENT
Start: 2017-07-30 | End: 2017-07-30

## 2017-07-30 NOTE — ED PROVIDER NOTES
Patient Seen in: [facility] In [address] [place] [address]    History   Patient presents with:  Lower Extremity Injury (musculoskeletal)    Stated Complaint: Lt Toe Injury    HPI  Yesterday patient was at home when she stepped on a blanket that was on Smoking status: Current Every Day Smoker                                                   Packs/day: 0.50      Years: 0.00      Smokeless tobacco: Never Used                      Alcohol use:  No                Review of Systems    Positive for stated comp XR TOE(S) (MIN 2 VIEWS), LEFT 1ST (CPT=73660) (Final result)   Result time 07/30/17 09:08:40   Final result by Gertrude Hilario MD (07/30/17 09:08:40)                Impression:    CONCLUSION: Soft tissue swelling without acute fracture or dislocation.

## 2017-07-30 NOTE — ED NOTES
Wear post op shoe ice elevate follow up with Dr. Erika Aguayo  in 2 days go to the ed for swelling increased pain new or worse concerns

## 2017-07-31 ENCOUNTER — TELEPHONE (OUTPATIENT)
Dept: GASTROENTEROLOGY | Facility: CLINIC | Age: 57
End: 2017-07-31

## 2017-07-31 ENCOUNTER — HOSPITAL ENCOUNTER (OUTPATIENT)
Facility: HOSPITAL | Age: 57
Setting detail: HOSPITAL OUTPATIENT SURGERY
Discharge: HOME | End: 2017-07-31
Attending: INTERNAL MEDICINE | Admitting: INTERNAL MEDICINE
Payer: COMMERCIAL

## 2017-07-31 ENCOUNTER — SURGERY (OUTPATIENT)
Age: 57
End: 2017-07-31

## 2017-07-31 VITALS
SYSTOLIC BLOOD PRESSURE: 113 MMHG | DIASTOLIC BLOOD PRESSURE: 78 MMHG | OXYGEN SATURATION: 95 % | HEART RATE: 85 BPM | WEIGHT: 120 LBS | BODY MASS INDEX: 21.26 KG/M2 | RESPIRATION RATE: 16 BRPM | HEIGHT: 63 IN

## 2017-07-31 NOTE — TELEPHONE ENCOUNTER
Spoke to pharmacy. They wanted to know if the Colyte 240 gm is ok. Pharmacist notified this would be ok.

## 2017-07-31 NOTE — TELEPHONE ENCOUNTER
GI RNs and Gladys Swain  Ms. Shane Hidden states that she did not receive the Colyte prescription mailed to her in May. She was unable to reach us by telephone before the procedure. Unfortunately she did not drink the bowel prep last night.   Today's colonoscopy ca

## 2017-07-31 NOTE — TELEPHONE ENCOUNTER
Pharmacy called to clarify Colyte RX. Please call.         Current Outpatient Prescriptions:   •  PEG 3350-KCl-NaBcb-NaCl-NaSulf (COLYTE WITH FLAVOR PACKS) 227.1 g Oral Recon Soln, Mix up and chill, then drink as directed., Disp: 1 Bottle, Rfl: 0

## 2017-07-31 NOTE — TELEPHONE ENCOUNTER
This should have been addressed by GI who performs the colonoscopy. Left message informing pt of this and to only call back if she still needs something from Dr Alpa Garcia office where this message was routed.

## 2017-08-09 ENCOUNTER — OFFICE VISIT (OUTPATIENT)
Dept: OTOLARYNGOLOGY | Facility: CLINIC | Age: 57
End: 2017-08-09

## 2017-08-09 VITALS
HEIGHT: 63 IN | BODY MASS INDEX: 21.26 KG/M2 | WEIGHT: 120 LBS | SYSTOLIC BLOOD PRESSURE: 130 MMHG | TEMPERATURE: 98 F | HEART RATE: 68 BPM | DIASTOLIC BLOOD PRESSURE: 70 MMHG

## 2017-08-09 DIAGNOSIS — R59.1 LYMPHADENOPATHY: Primary | ICD-10-CM

## 2017-08-09 PROCEDURE — 38505 NEEDLE BIOPSY LYMPH NODES: CPT | Performed by: OTOLARYNGOLOGY

## 2017-08-09 PROCEDURE — 99212 OFFICE O/P EST SF 10 MIN: CPT | Performed by: OTOLARYNGOLOGY

## 2017-08-09 PROCEDURE — 99213 OFFICE O/P EST LOW 20 MIN: CPT | Performed by: OTOLARYNGOLOGY

## 2017-08-09 RX ORDER — METHYLPREDNISOLONE 4 MG/1
TABLET ORAL
Qty: 1 PACKAGE | Refills: 0 | Status: SHIPPED | OUTPATIENT
Start: 2017-08-09

## 2017-08-09 RX ORDER — CLINDAMYCIN HYDROCHLORIDE 150 MG/1
300 CAPSULE ORAL 3 TIMES DAILY
Qty: 28 CAPSULE | Refills: 0 | Status: SHIPPED | OUTPATIENT
Start: 2017-08-09 | End: 2017-08-23

## 2017-08-09 NOTE — PROGRESS NOTES
Clara Freeman is a 65year old female. Patient presents with:   Follow - Up: Follow Up CT Scan done on 7/28/17      HISTORY OF PRESENT ILLNESS  7/7/2017  She presents with neck pain she attributes this to the fact that she had shingles and after having Gall stones    • Hyperlipidemia    • Kidney stones    • Lung nodule    • Vitamin D deficiency        Past Surgical History:  12/16/14: COLONOSCOPY  2005[place] [address]  2005: LITHOTRIPSY  4/28/15: [facility] ENDOSCOPY,BIOPSY Normal. TM - Right: Normal, Left: Normal.   Skin Normal Inspection - Normal.         Lymph Detail Normal Submental. Submandibular. Anterior cervical. Posterior cervical. Supraclavicular.    Larynx  No lesions noted with normal vc monbility   Nose/Mouth/Thro believe this is an infectious etiology possibly from her teeth    2. Postherpetic neuralgia  Is significant pain out of proportion to her physical findings I wonder if this is represents postherpetic neuralgia.             Roslyn Orellana MD

## 2017-08-09 NOTE — PATIENT INSTRUCTIONS
Lymphadenopathy  Lymphadenopathy is swelling of the lymph nodes. Lymph nodes are small, bean-shaped glands around the body. What are lymph nodes? Lymph nodes are part of your immune system.  The glands are found in your neck, armpits, groin, chest, and You may also have symptoms from an infection causing the swollen glands. These symptoms may include fever, sore throat, body aches, or cough. Diagnosing lymphadenopathy  Your health care provider will ask about your health history and symptoms.  He or she © [phone] [address], [address] [place]. All rights reserved. This information is not intended as a substitute for professional medical care. Always follow your healthcare professional's instructions.

## 2017-08-11 ENCOUNTER — TELEPHONE (OUTPATIENT)
Dept: OTOLARYNGOLOGY | Facility: CLINIC | Age: 57
End: 2017-08-11

## 2017-08-11 DIAGNOSIS — R59.1 LYMPHADENOPATHY: Primary | ICD-10-CM

## 2017-08-16 NOTE — TELEPHONE ENCOUNTER
Pt informed of note below and pt order generated for US Guided biopsy and faxed to [place] in [address].

## 2017-08-16 NOTE — TELEPHONE ENCOUNTER
Ami Rand MD   to [facility] Clinical Staff           12:42 PM   Please call pt and inform her the biopsy was non conclusive Id like to schedule her for an \"ultrasound guided core biopsy of right neck lymph node include flow cytometry\"

## 2017-09-05 ENCOUNTER — HOSPITAL ENCOUNTER (OUTPATIENT)
Dept: ULTRASOUND IMAGING | Facility: HOSPITAL | Age: 57
Discharge: HOME | End: 2017-09-05
Attending: OTOLARYNGOLOGY
Payer: COMMERCIAL

## 2017-09-05 NOTE — IMAGING NOTE
NO NOTE: PT DID NOT ARRIVE FOR BIOPSY. CALLED PT, AND SHE STATED SHE WAS UNAWARE SHE HAD A BIOPSY SCHEDULED FOR TODAY.  PT ACKNOWLEDGED NEED TO RESCHEDULE

## 2017-09-11 ENCOUNTER — TELEPHONE (OUTPATIENT)
Dept: GASTROENTEROLOGY | Facility: CLINIC | Age: 57
End: 2017-09-11

## 2017-09-11 DIAGNOSIS — R93.89 ABNORMAL CT SCAN: ICD-10-CM

## 2017-09-11 DIAGNOSIS — R10.9 ABDOMINAL PAIN, UNSPECIFIED ABDOMINAL LOCATION: ICD-10-CM

## 2017-09-11 DIAGNOSIS — R19.4 CHANGE IN BOWEL HABITS: Primary | ICD-10-CM

## 2017-09-11 NOTE — TELEPHONE ENCOUNTER
Pt called to schedule colonoscopy. Pt was previously scheduled for 7/31/17 but had to cancel. Please call.

## 2017-09-13 ENCOUNTER — OFFICE VISIT (OUTPATIENT)
Dept: PULMONOLOGY | Facility: CLINIC | Age: 57
End: 2017-09-13

## 2017-09-13 VITALS
BODY MASS INDEX: 21.37 KG/M2 | HEART RATE: 67 BPM | RESPIRATION RATE: 18 BRPM | SYSTOLIC BLOOD PRESSURE: 126 MMHG | DIASTOLIC BLOOD PRESSURE: 82 MMHG | WEIGHT: 120.63 LBS | OXYGEN SATURATION: 98 % | HEIGHT: 63 IN

## 2017-09-13 DIAGNOSIS — R59.1 HEAD AND NECK LYMPHADENOPATHY: ICD-10-CM

## 2017-09-13 DIAGNOSIS — R93.89 ABNORMAL CT OF THE CHEST: Primary | ICD-10-CM

## 2017-09-13 DIAGNOSIS — Z72.0 TOBACCO ABUSE: ICD-10-CM

## 2017-09-13 PROCEDURE — 99212 OFFICE O/P EST SF 10 MIN: CPT | Performed by: INTERNAL MEDICINE

## 2017-09-13 PROCEDURE — 99243 OFF/OP CNSLTJ NEW/EST LOW 30: CPT | Performed by: INTERNAL MEDICINE

## 2017-09-13 RX ORDER — VENLAFAXINE HYDROCHLORIDE 37.5 MG/1
CAPSULE, EXTENDED RELEASE ORAL
COMMUNITY
Start: 2017-09-01

## 2017-09-13 NOTE — PROGRESS NOTES
Pulmonary Consult Note    HPI:   Peterson Palmer is a 65year old female with Patient presents with:  Consult: Abnormal CT Chest    Brittney Ramos MD    Pt is a 64 yo smoker with h/o anxiety/depression, cervical cancer, HL, and other med prob who has n Rfl: 0   nicotine 21 MG/24HR Transdermal Patch 24 Hr  Disp:  Rfl: 0         Allergies:    Atorvastatin Calciu*    Rash  Ibuprofen               Rash  Strawberry              Rash, Swelling    Comment:Swelling (tongue)    Social History:  Social History pt however  Plan: rec quit smoking   Follow expectantly    (Z72.0) Tobacco abuse  Extensive counseling provided  Plan: asked pt to return to clinic when she is ready to consider stopping smoking    (R59.1) Head and neck lymphadenopathy  New  Plan: f/u ENT

## 2017-09-14 NOTE — TELEPHONE ENCOUNTER
Dr. Morgan Benson - I spoke with this pt, she has [employer] & is not planning on changing insurances. She originally had her procedure scheduled in July, but had to cancel. I told her I would need to get back to her about scheduling.  Is there any way we can fit

## 2017-09-14 NOTE — TELEPHONE ENCOUNTER
OK to fit in / overbook anywhere we can - Tuesday probably makes the most sense. Needs to be at [address] [place] [address]. Please either overbook now for any Tuesday remaining this month (9/19 or 9/26) or call on a Friday/Monday when the blocks open up to book on a Tuesday.

## 2017-09-18 NOTE — TELEPHONE ENCOUNTER
CBLM to schedule procedure. Please transfer to Karin Sykes or Olivia Tidwell in GI. Will schedule colon with Dr. Kay Gorman on 9/26/17 @ 12:45 pm if pt is ok with date/time. Ok to book per [employer].

## 2017-09-19 NOTE — TELEPHONE ENCOUNTER
Scheduled for:  Colonoscopy - [address]3472  Provider Name:  Dr. Reyna Gr  Date:  9/26/17  Location:  [facility]  Sedation:  MAC  Time:  12:45 pm (pt is aware to arrive at 11:45 am), time OK per Radha  Prep:  Colyte, Prep instructions were given to pt over the phone, pt verb

## 2017-09-19 NOTE — TELEPHONE ENCOUNTER
Dr. Morgan Del Rio I was able to get this [facility] pt in for you on Tuesday, 9/26/17 @ 12:45 pm. Please let me know if you have any questions or concerns. Thank you!

## 2017-09-26 ENCOUNTER — TELEPHONE (OUTPATIENT)
Dept: GASTROENTEROLOGY | Facility: CLINIC | Age: 57
End: 2017-09-26

## 2017-09-26 DIAGNOSIS — R19.4 CHANGE IN BOWEL HABITS: Primary | ICD-10-CM

## 2017-09-26 DIAGNOSIS — R10.9 ABDOMINAL PAIN, UNSPECIFIED ABDOMINAL LOCATION: ICD-10-CM

## 2017-09-26 DIAGNOSIS — R93.89 ABNORMAL CT SCAN: ICD-10-CM

## 2017-09-26 NOTE — TELEPHONE ENCOUNTER
Spoke to pt. She sounded very ill over the phone. Colonoscopy cancelled in EPIC and a Surgical Change Request was sent to [facility]. Pt has [address]. Will not be able to reschedule until pt has a new insurance.

## 2017-09-26 NOTE — TELEPHONE ENCOUNTER
Today's colonoscopy exam cancelled as per today's notes. Office visit of 5/12/2017 reviewed. Previous colonoscopy examination 12/16/2014 was reassuring with findings of internal hemorrhoids, suspicion for functional syndrome, \"IBS\".   Good prep avtar

## 2019-10-08 RX ORDER — NICOTINE 14MG/24HR
PATCH, TRANSDERMAL 24 HOURS TRANSDERMAL
Qty: 30 PATCH | Refills: 0 | Status: SHIPPED | OUTPATIENT
Start: 2019-10-08 | End: 2019-11-13

## 2019-10-08 NOTE — TELEPHONE ENCOUNTER
CSS--please call patient to schedule appt for refill--has not been in office in over 2 years (called [address] and relayed same message)    Review pended refill request as it does not fall under a protocol.     Last Rx: 9/26/15  LOV: 7/06/17  Requested

## 2019-10-17 RX ORDER — NICOTINE 21 MG/24HR
PATCH, TRANSDERMAL 24 HOURS TRANSDERMAL
Refills: 0 | OUTPATIENT
Start: 2019-10-17

## 2019-10-18 NOTE — TELEPHONE ENCOUNTER
ROXANE please assist with scheduling f/u appt, pt does not have mychart, thanks (LOV 2 years ago, davi no longer accepted)      Duplicate request, previously addressed, script sent 10/8/19 #30#0

## 2019-10-26 NOTE — TELEPHONE ENCOUNTER
Patient states no longer with [facility], [facility] due to insurance.

## 2019-11-14 RX ORDER — NICOTINE 21 MG/24HR
PATCH, TRANSDERMAL 24 HOURS TRANSDERMAL
Qty: 28 PATCH | Refills: 0 | Status: SHIPPED | OUTPATIENT
Start: 2019-11-14

## 2022-05-09 ENCOUNTER — HOSPITAL ENCOUNTER (EMERGENCY)
Facility: HOSPITAL | Age: 62
Discharge: HOME | End: 2022-05-09
Attending: EMERGENCY MEDICINE
Payer: MEDICAID

## 2022-05-09 ENCOUNTER — APPOINTMENT (OUTPATIENT)
Dept: GENERAL RADIOLOGY | Facility: HOSPITAL | Age: 62
End: 2022-05-09
Attending: EMERGENCY MEDICINE
Payer: MEDICAID

## 2022-05-09 VITALS
HEART RATE: 89 BPM | HEIGHT: 63 IN | OXYGEN SATURATION: 100 % | RESPIRATION RATE: 18 BRPM | TEMPERATURE: 98 F | BODY MASS INDEX: 23.92 KG/M2 | DIASTOLIC BLOOD PRESSURE: 81 MMHG | WEIGHT: 135 LBS | SYSTOLIC BLOOD PRESSURE: 95 MMHG

## 2022-05-09 DIAGNOSIS — J44.1 COPD EXACERBATION (HCC): Primary | ICD-10-CM

## 2022-05-09 LAB
ANION GAP SERPL CALC-SCNC: 8 MMOL/L (ref 0–18)
BASOPHILS # BLD AUTO: 0.07 X10(3) UL (ref 0–0.2)
BASOPHILS NFR BLD AUTO: 0.9 %
BUN BLD-MCNC: 25 MG/DL (ref 7–18)
BUN/CREAT SERPL: 14.5 (ref 10–20)
CALCIUM BLD-MCNC: 9.3 MG/DL (ref 8.5–10.1)
CHLORIDE SERPL-SCNC: 103 MMOL/L (ref 98–112)
CO2 SERPL-SCNC: 25 MMOL/L (ref 21–32)
CREAT BLD-MCNC: 1.72 MG/DL
DEPRECATED RDW RBC AUTO: 41.8 FL (ref 35.1–46.3)
EOSINOPHIL # BLD AUTO: 0.22 X10(3) UL (ref 0–0.7)
EOSINOPHIL NFR BLD AUTO: 2.9 %
ERYTHROCYTE [DISTWIDTH] IN BLOOD BY AUTOMATED COUNT: 12.9 % (ref 11–15)
FLUAV + FLUBV RNA SPEC NAA+PROBE: NEGATIVE
FLUAV + FLUBV RNA SPEC NAA+PROBE: NEGATIVE
GLUCOSE BLD-MCNC: 144 MG/DL (ref 70–99)
HCT VFR BLD AUTO: 40.9 %
HGB BLD-MCNC: 13.2 G/DL
IMM GRANULOCYTES # BLD AUTO: 0.02 X10(3) UL (ref 0–1)
IMM GRANULOCYTES NFR BLD: 0.3 %
LYMPHOCYTES # BLD AUTO: 1.35 X10(3) UL (ref 1–4)
LYMPHOCYTES NFR BLD AUTO: 18 %
MCH RBC QN AUTO: 28.5 PG (ref 26–34)
MCHC RBC AUTO-ENTMCNC: 32.3 G/DL (ref 31–37)
MCV RBC AUTO: 88.3 FL
MONOCYTES # BLD AUTO: 0.54 X10(3) UL (ref 0.1–1)
MONOCYTES NFR BLD AUTO: 7.2 %
NEUTROPHILS # BLD AUTO: 5.29 X10 (3) UL (ref 1.5–7.7)
NEUTROPHILS # BLD AUTO: 5.29 X10(3) UL (ref 1.5–7.7)
NEUTROPHILS NFR BLD AUTO: 70.7 %
OSMOLALITY SERPL CALC.SUM OF ELEC: 289 MOSM/KG (ref 275–295)
PLATELET # BLD AUTO: 253 10(3)UL (ref 150–450)
POTASSIUM SERPL-SCNC: 3.4 MMOL/L (ref 3.5–5.1)
RBC # BLD AUTO: 4.63 X10(6)UL
RSV RNA SPEC NAA+PROBE: NEGATIVE
SARS-COV-2 RNA RESP QL NAA+PROBE: NOT DETECTED
SODIUM SERPL-SCNC: 136 MMOL/L (ref 136–145)
TROPONIN I HIGH SENSITIVITY: <3 NG/L
WBC # BLD AUTO: 7.5 X10(3) UL (ref 4–11)

## 2022-05-09 PROCEDURE — 99284 EMERGENCY DEPT VISIT MOD MDM: CPT

## 2022-05-09 PROCEDURE — 84484 ASSAY OF TROPONIN QUANT: CPT | Performed by: EMERGENCY MEDICINE

## 2022-05-09 PROCEDURE — 93010 ELECTROCARDIOGRAM REPORT: CPT | Performed by: EMERGENCY MEDICINE

## 2022-05-09 PROCEDURE — 36415 COLL VENOUS BLD VENIPUNCTURE: CPT

## 2022-05-09 PROCEDURE — 94640 AIRWAY INHALATION TREATMENT: CPT

## 2022-05-09 PROCEDURE — 85025 COMPLETE CBC W/AUTO DIFF WBC: CPT | Performed by: EMERGENCY MEDICINE

## 2022-05-09 PROCEDURE — 93005 ELECTROCARDIOGRAM TRACING: CPT

## 2022-05-09 PROCEDURE — 71045 X-RAY EXAM CHEST 1 VIEW: CPT | Performed by: EMERGENCY MEDICINE

## 2022-05-09 PROCEDURE — 0241U SARS-COV-2/FLU A AND B/RSV BY PCR (GENEXPERT): CPT | Performed by: EMERGENCY MEDICINE

## 2022-05-09 PROCEDURE — 80048 BASIC METABOLIC PNL TOTAL CA: CPT | Performed by: EMERGENCY MEDICINE

## 2022-05-09 RX ORDER — PREDNISONE 20 MG/1
60 TABLET ORAL ONCE
Status: COMPLETED | OUTPATIENT
Start: 2022-05-09 | End: 2022-05-09

## 2022-05-09 RX ORDER — PREDNISONE 20 MG/1
40 TABLET ORAL ONCE
Status: DISCONTINUED | OUTPATIENT
Start: 2022-05-09 | End: 2022-05-09

## 2022-05-09 RX ORDER — PREDNISONE 20 MG/1
TABLET ORAL
Qty: 12 TABLET | Refills: 0 | Status: SHIPPED | OUTPATIENT
Start: 2022-05-09

## 2022-05-09 RX ORDER — ALBUTEROL SULFATE 2.5 MG/3ML
2.5 SOLUTION RESPIRATORY (INHALATION) ONCE
Status: COMPLETED | OUTPATIENT
Start: 2022-05-09 | End: 2022-05-09

## 2024-10-13 NOTE — TELEPHONE ENCOUNTER
Spoke with patient (name and  verified),  CC: rash onset over 2 days  Patient states she has had shingles before and asked 'can it come back?'  She has had shingles in the past and now has rash on back and back of the head, red and raised like blisters, pt c/o upper right abdominal pain starting 3 days ago, pt sent from [facility] today for + mast's sign. denies past med hx

## 2024-11-25 ENCOUNTER — TELEPHONE (OUTPATIENT)
Facility: CLINIC | Age: 64
End: 2024-11-25

## 2024-11-25 NOTE — TELEPHONE ENCOUNTER
----- Message from Poly NORRIS sent at 9/23/2015  1:09 PM CDT -----  Regarding: Recall colon   Recall patient for colon in 10 years per CB.

## (undated) DEVICE — ENDOSCOPY PACK - LOWER: Brand: MEDLINE INDUSTRIES, INC.

## (undated) DEVICE — Device: Brand: DEFENDO AIR/WATER/SUCTION AND BIOPSY VALVE

## (undated) NOTE — MR AVS SNAPSHOT
[address]  [address] [facility]  [phone]               Thank you for choosing us for your health care visit with Jerome Huerta MD.  We are glad to serve you and happy to provide you with this summa QUEtiapine Fumarate 100 MG Tabs   Commonly known as:  SEROQUEL           ValACYclovir HCl 1 G Tabs   Take 1 tablet (1,000 mg total) by mouth 3 (three) times daily.    Commonly known as:  VALTREX           ZOLOFT 100 MG Tabs   Generic drug:  Sertraline H

## (undated) NOTE — MR AVS SNAPSHOT
[facility]  [address] [place] [address], [address] [place], [place] [address]emily Josesito               Thank you for choosing us for your health care visit with Naya Kulkarni MD.  We are glad to serve you and happy to provide you with this summary of Where to Get Your Medications      These medications were sent to [address] [address] [phone], [phone]  [address], [facility] [address]     Phone:  [phone]    - cephALEXin 500 MG Caps  - Hydr

## (undated) NOTE — MR AVS SNAPSHOT
[address]  [address] [facility]  [phone]               Thank you for choosing us for your health care visit with Nereyda Lopez MD.  We are glad to serve you and happy to provide you with th [employer] for assistance with your inactive [email or web address] account    If you have questions, you can call [phone] to talk to our [facility] Staff. Remember, [email or web address] is NOT to be used for urgent needs. For medical emergencies, dial 911.     Ko

## (undated) NOTE — LETTER
[address] [place] [address] Rd, [place], [place]     AUTHORIZATION FOR SURGICAL OPERATION OR PROCEDURE    1.  I hereby authorize Dr. Leila Butler my Physician(s) and whomever may be designated as the doctor's Assistant, to perform the following ope Physician. 5. I consent to the photographing of procedure(s) to be performed for the purposes of advancing medicine, science and/or education, provided my identity is not revealed.  If the procedure has been videotaped, the physician/surgeon will obtain th (Witness signature)                                                                                                  (Date)                                (Time)  STATEMENT OF PHYSICIAN My signature below affirms that prior to the time of the procedure;  I

## (undated) NOTE — MR AVS SNAPSHOT
[address]  [address] [facility]  [phone]               Thank you for choosing us for your health care visit with Eliazar Taylor MD.  We are glad to serve you and happy to provide you with this summa Pravastatin Sodium 40 MG Tabs   TAKE 1 TABLET (40 MG TOTAL) BY MOUTH NIGHTLY.    Commonly known as:  PRAVACHOL           QUEtiapine Fumarate 100 MG Tabs   Commonly known as:  SEROQUEL           ValACYclovir HCl 1 G Tabs   Take 1 tablet (1,000 mg total) by

## (undated) NOTE — MR AVS SNAPSHOT
[address]  [address] [facility]  [phone]               Thank you for choosing us for your health care visit with Cuba Cotto MD.  We are glad to serve you and happy to provide you with this summa This list is accurate as of: 3/9/17  2:57 PM.  Always use your most recent med list.                alprazolam 0.5 MG Tabs   Commonly known as:  XANAX           Ciprofloxacin HCl 500 MG Tabs   Take 1 tablet (500 mg total) by mouth 2 (two) times daily.    Co Dietary sodium reduction Reduce dietary sodium intake to <= 100 mmol per day (2.4 g sodium or 6 g sodium chloride)   Aerobic physical activity Regular aerobic physical activity (e.g., brisk walking, light jogging, cycling, swimming, etc.) for a goal of at

## (undated) NOTE — MR AVS SNAPSHOT
[address]  [address] [facility]  [phone]               Thank you for choosing us for your health care visit with Irma Ma DO.   We are glad to serve you and happy to provide you with this summary Commonly known as:  SEROQUEL           SUMAtriptan Succinate 100 MG Tabs   Take 1 tablet by mouth every 2 (two) hours as needed for Migraine. Commonly known as:  IMITREX           ZOLOFT 100 MG Tabs   Generic drug:  Sertraline HCl   Take  by mouth. physician's office. At that time, you will be provided with any authorization numbers or be assured that none are required. You can then schedule your appointment.  Failure to obtain required authorization numbers can create reimbursement difficulties for y

## (undated) NOTE — LETTER
11/25/2024    Trini Colon        [address] [facility] [address]            Dear Trini Colon,      Our records indicate that you are due for an appointment for a Colonoscopy with Sharif Ordaz MD. Our doctors are booking out about 3-6 months in advance for procedures.     Please call our office to schedule a phone screening appointment to plan for the procedure(s).   Your medical well-being is important to us.    If your insurance requires a referral, please call your primary care office to request one.      Thank you,      The Physicians and Staff at [facility]